# Patient Record
Sex: MALE | Race: WHITE | NOT HISPANIC OR LATINO | Employment: STUDENT | URBAN - METROPOLITAN AREA
[De-identification: names, ages, dates, MRNs, and addresses within clinical notes are randomized per-mention and may not be internally consistent; named-entity substitution may affect disease eponyms.]

---
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
## 2024-05-12 NOTE — LETTER
February 23, 2018                 [facility]  Internal Medicine  [address] [place] [id number]  Phone: [phone]  Fax: [phone]   February 23, 2018     Patient: Haim Fu   YOB: 2000   Date of Visit: 2/23/2018       To Whom it May Concern:    Haim Fu was seen in my clinic on 2/23/2018. He may return to school 2/26/18.    If you have any questions or concerns, please don't hesitate to call.    Sincerely,         Amira Hook MD/Lyndsay CARTER      No

## 2025-08-18 ENCOUNTER — OFFICE VISIT (OUTPATIENT)
Facility: CLINIC | Age: 25
End: 2025-08-18
Payer: COMMERCIAL

## 2025-08-18 VITALS
WEIGHT: 175.94 LBS | SYSTOLIC BLOOD PRESSURE: 124 MMHG | HEART RATE: 102 BPM | RESPIRATION RATE: 16 BRPM | DIASTOLIC BLOOD PRESSURE: 78 MMHG

## 2025-08-18 DIAGNOSIS — R79.89 LOW TESTOSTERONE IN MALE: Primary | ICD-10-CM

## 2025-08-18 DIAGNOSIS — R53.83 FATIGUE, UNSPECIFIED TYPE: ICD-10-CM

## 2025-08-18 PROCEDURE — 1159F MED LIST DOCD IN RCRD: CPT | Mod: CPTII,S$GLB,, | Performed by: UROLOGY

## 2025-08-18 PROCEDURE — 3078F DIAST BP <80 MM HG: CPT | Mod: CPTII,S$GLB,, | Performed by: UROLOGY

## 2025-08-18 PROCEDURE — 99999 PR PBB SHADOW E&M-NEW PATIENT-LVL III: CPT | Mod: PBBFAC,,, | Performed by: UROLOGY

## 2025-08-18 PROCEDURE — 99204 OFFICE O/P NEW MOD 45 MIN: CPT | Mod: S$GLB,,, | Performed by: UROLOGY

## 2025-08-18 PROCEDURE — 3074F SYST BP LT 130 MM HG: CPT | Mod: CPTII,S$GLB,, | Performed by: UROLOGY

## 2025-08-18 RX ORDER — LISDEXAMFETAMINE DIMESYLATE 30 MG/1
30 CAPSULE ORAL EVERY MORNING
COMMUNITY
Start: 2025-07-20

## 2025-08-18 RX ORDER — ERGOCALCIFEROL 1.25 MG/1
50000 CAPSULE ORAL
COMMUNITY
Start: 2025-08-15

## 2025-08-20 ENCOUNTER — LAB VISIT (OUTPATIENT)
Dept: LAB | Facility: HOSPITAL | Age: 25
End: 2025-08-20
Attending: UROLOGY
Payer: COMMERCIAL

## 2025-08-20 DIAGNOSIS — R53.83 FATIGUE, UNSPECIFIED TYPE: ICD-10-CM

## 2025-08-20 DIAGNOSIS — R79.89 LOW TESTOSTERONE IN MALE: ICD-10-CM

## 2025-08-20 LAB
LH SERPL-ACNC: 1.8 MIU/ML (ref 0.6–12.1)
PROLACTIN SERPL IA-MCNC: 16.2 NG/ML (ref 3.5–19.4)

## 2025-08-20 PROCEDURE — 82672 ASSAY OF ESTROGEN: CPT

## 2025-08-20 PROCEDURE — 83002 ASSAY OF GONADOTROPIN (LH): CPT

## 2025-08-20 PROCEDURE — 36415 COLL VENOUS BLD VENIPUNCTURE: CPT | Mod: PN

## 2025-08-20 PROCEDURE — 82040 ASSAY OF SERUM ALBUMIN: CPT

## 2025-08-20 PROCEDURE — 84146 ASSAY OF PROLACTIN: CPT

## 2025-08-21 ENCOUNTER — TELEPHONE (OUTPATIENT)
Dept: UROLOGY | Facility: CLINIC | Age: 25
End: 2025-08-21
Payer: COMMERCIAL

## 2025-08-26 LAB
ESTRADIOL: 20 PG/ML
ESTROGEN SERPL-MCNC: 42 PG/ML
ESTRONE PG/ML: 22 PG/ML
W ALBUMIN: 4.5 G/DL
W SEX HORMONE BINDING GLOBULIN: 23 NMOL/L
W TESTOSTERONE, BIOAVAIL: 158.7 NG/DL
W TESTOSTERONE, FREE: 77.6 PG/ML
W TESTOSTERONE, TOTAL, MS: 440 NG/DL

## 2025-08-27 ENCOUNTER — TELEPHONE (OUTPATIENT)
Dept: UROLOGY | Facility: CLINIC | Age: 25
End: 2025-08-27
Payer: COMMERCIAL

## (undated) DEVICE — SEE MEDLINE ITEM 152530

## (undated) DEVICE — ADHESIVE MASTISOL VIAL 48/BX

## (undated) DEVICE — UNDERGLOVES BIOGEL PI SIZE 7.5

## (undated) DEVICE — PACK FLUID CONTROL SHOULDER

## (undated) DEVICE — KIT FIXATION PERC ARTHSCP 2.9

## (undated) DEVICE — SEE MEDLINE ITEM 157216

## (undated) DEVICE — GAUZE SPONGE 4X4 12PLY

## (undated) DEVICE — CHLORAPREP 1.5 ML FREPP

## (undated) DEVICE — BURR OVAL 8 FLUTE 4MMX13CM

## (undated) DEVICE — ELECTRODE COOLPULSE 90 W/HAND

## (undated) DEVICE — SEE MEDLINE ITEM 152739

## (undated) DEVICE — MANIFOLD 4 PORT

## (undated) DEVICE — APPLICATOR CHLORAPREP ORN 26ML

## (undated) DEVICE — DRAPE STERI U-SHAPED 47X51IN

## (undated) DEVICE — GLOVE SURG BIOGEL LATEX SZ 7.5

## (undated) DEVICE — NDL SUREFIRE SCORPION RC

## (undated) DEVICE — CORD BIPOLAR ELECTROSURGICAL

## (undated) DEVICE — PROBE MULTI PORT RF 90 DEGREE

## (undated) DEVICE — DYNACORD

## (undated) DEVICE — SEE MEDLINE ITEM 157166

## (undated) DEVICE — SEE MEDLINE ITEM 152622

## (undated) DEVICE — TIP SUCTION YANKAUER

## (undated) DEVICE — KIT TRIMANO CHIN

## (undated) DEVICE — TUBING SET EXTENSIONS IV 20 SL

## (undated) DEVICE — SEE MEDLINE ITEM 157027

## (undated) DEVICE — CUBE COLD THERAPY POLAR CARE

## (undated) DEVICE — Device

## (undated) DEVICE — SUT PROLENE 0 MO6 30IN BLUE

## (undated) DEVICE — SUT 0 30IN PROLENE BL MONO

## (undated) DEVICE — BLADE COOLCUT EXCALIBER 4X13

## (undated) DEVICE — SUT LABRALTAPE 1.5X36 WHITE

## (undated) DEVICE — PAD ABD 8X10 STERILE

## (undated) DEVICE — SYR 30CC LUER LOCK

## (undated) DEVICE — UNDERGLOVES BIOGEL PI SIZE 8

## (undated) DEVICE — ALCOHOL 70% ISOP RUBBING 4OZ

## (undated) DEVICE — CLOSURE SKIN STERI STRIP 1/2X4

## (undated) DEVICE — SUT FIBERWIRE

## (undated) DEVICE — SUT PROLENE 1 CTX 30IN BLUE

## (undated) DEVICE — SOL IRR NACL .9% 3000ML

## (undated) DEVICE — DRESSING XEROFORM FOIL PK 1X8

## (undated) DEVICE — COVER CAMERA OPERATING ROOM

## (undated) DEVICE — CANNULA MTK THRD CLR 8.5X75MM

## (undated) DEVICE — SEE MEDLINE ITEM 146420

## (undated) DEVICE — SLEEVE SHOULDER TRACTN/ROTATN

## (undated) DEVICE — TAPE SURGICAL MICROFOAM 4IN

## (undated) DEVICE — SEE MEDLINE ITEM 157131

## (undated) DEVICE — CANNULA MTK THRD CLR 7X75MM

## (undated) DEVICE — GLOVE 7.5 PROTEXIS PI ORTHO PF

## (undated) DEVICE — DRAPE INCISE IOBAN 2 23X33IN

## (undated) DEVICE — DRAPE STERI INSTRUMENT 1018

## (undated) DEVICE — PASSER TIGHT CRV QUICKPASS L

## (undated) DEVICE — SKIN MARKER DEVON 160

## (undated) DEVICE — SUT ETHICON 3-0 BLK MONO PS

## (undated) DEVICE — BLADE SHAVER TORPEDO 4MMX13CM

## (undated) DEVICE — SLING ARM ULTRA III PAD LG

## (undated) DEVICE — SEE MEDLINE ITEM 157117

## (undated) DEVICE — DRAPE PLASTIC U 60X72